# Patient Record
(demographics unavailable — no encounter records)

---

## 2025-04-15 NOTE — ASSESSMENT
[FreeTextEntry1] : 47 year F with bilateral knee clicking with mild OA, lateral hip bursitis and mild DDD - physical therapy and NSAIDs (mobic) was prescribed  - Return in 6 weeks for follow up PRN

## 2025-04-15 NOTE — IMAGING
[de-identified] : Constitutional: well developed and well nourished, able to communicate Cardiovascular: Peripheral vascular exam is grossly normal Neurologic: Alert and oriented, no acute distress. Skin: normal skin with no ulcers, rashes, or lesions Pulmonary: No respiratory distress, breathing comfortably on room air Lymphatics: No obvious lymphadenopathy or lymphedema in areas examined  B/L KNEE EXAM Alignment: Neutral  Effusion: None Atrophy: None                                                  Stable to Varus/valgus stress Posterior Drawer Test: negative Anterior Drawer Test: Negative Knee Extension/Flexion: 0 / 120  Medial/lateral compartments Medial joint line: POS Tenderness Lateral joint line: No Tenderness Kiesha test: negative  Patellofemoral joint Medial patellar facet: no tenderness Patellar grind: Negative  Tendons: Pes Anserine: No tenderness Gerdys Tubercle/ IT Band: POS tenderness Quadriceps Tendon: No Tenderness patellar tendon: no Tenderness Tibial tubercle: not tenderness Calf: no Tenderness  Neurovascular exam Muscle strength: 5/5 Sensation to light touch: intact Distal pulses: 2+  IMAGIN/15/2025 Xrays of the B/L Knee were taken demonstrating PF and and medial compartment OA hip no signs of fractures, dislocations,or significant arthritis.  lumbar with mild scoliosis

## 2025-04-15 NOTE — HISTORY OF PRESENT ILLNESS
[de-identified] : 4/15/25 patient here for evaluation of MICHELLE knee pain. patient states she has been having clicking in her left knee for the past4 months, recently in the last month she has had the same clicking in the right knee. reports a fall over 4 years ago. Has also been having pain in hips that comes and goes no prior treatment , describes symptoms as no pain just a clicking discomfort when walking up stairs.